# Patient Record
Sex: MALE | Race: WHITE | NOT HISPANIC OR LATINO | URBAN - METROPOLITAN AREA
[De-identification: names, ages, dates, MRNs, and addresses within clinical notes are randomized per-mention and may not be internally consistent; named-entity substitution may affect disease eponyms.]

---

## 2019-11-01 ENCOUNTER — OFFICE VISIT (OUTPATIENT)
Dept: RETAIL CLINIC | Facility: CLINIC | Age: 29
End: 2019-11-01

## 2019-11-01 VITALS
DIASTOLIC BLOOD PRESSURE: 72 MMHG | OXYGEN SATURATION: 98 % | SYSTOLIC BLOOD PRESSURE: 110 MMHG | TEMPERATURE: 97.9 F | HEART RATE: 88 BPM | RESPIRATION RATE: 18 BRPM

## 2019-11-01 DIAGNOSIS — B97.89 VIRAL RESPIRATORY ILLNESS: Primary | ICD-10-CM

## 2019-11-01 DIAGNOSIS — H66.92 LEFT OTITIS MEDIA, UNSPECIFIED OTITIS MEDIA TYPE: ICD-10-CM

## 2019-11-01 DIAGNOSIS — J98.8 VIRAL RESPIRATORY ILLNESS: Primary | ICD-10-CM

## 2019-11-01 PROCEDURE — 99203 OFFICE O/P NEW LOW 30 MIN: CPT | Performed by: NURSE PRACTITIONER

## 2019-11-01 RX ORDER — METHYLPREDNISOLONE 4 MG/1
TABLET ORAL
Qty: 21 TABLET | Refills: 0 | Status: SHIPPED | OUTPATIENT
Start: 2019-11-01

## 2019-11-01 RX ORDER — AZITHROMYCIN 250 MG/1
TABLET, FILM COATED ORAL
Qty: 6 TABLET | Refills: 0 | Status: SHIPPED | OUTPATIENT
Start: 2019-11-01

## 2019-11-01 RX ORDER — ESCITALOPRAM OXALATE 10 MG/1
10 TABLET ORAL DAILY
COMMUNITY

## 2019-11-01 NOTE — PROGRESS NOTES
Subjective   Bassem Hodges Jr. is a 29 y.o. male.     URI    This is a new problem. The current episode started in the past 7 days (3 days). The problem has been gradually worsening. Maximum temperature: unmeaured. Associated symptoms include congestion, coughing, headaches, a plugged ear sensation (left), rhinorrhea, sinus pain, sneezing and a sore throat. Pertinent negatives include no wheezing. Treatments tried: dayquil, mucinex. The treatment provided no relief.        The following portions of the patient's history were reviewed and updated as appropriate: allergies, current medications, past family history, past medical history, past social history, past surgical history and problem list.    Review of Systems   Constitutional: Positive for chills and fatigue. Negative for fever.   HENT: Positive for congestion, postnasal drip, rhinorrhea, sinus pressure, sneezing and sore throat.    Respiratory: Positive for cough. Negative for chest tightness, shortness of breath and wheezing.    Cardiovascular: Negative.    Gastrointestinal: Negative.    Musculoskeletal: Positive for myalgias.       Objective   Physical Exam   Constitutional: He is oriented to person, place, and time. He appears well-developed.   HENT:   Head: Normocephalic.   Right Ear: Ear canal normal. A middle ear effusion is present.   Left Ear: Ear canal normal. Tympanic membrane is erythematous. A middle ear effusion is present.   Nose: Mucosal edema and congestion present. No rhinorrhea. Right sinus exhibits no maxillary sinus tenderness and no frontal sinus tenderness. Left sinus exhibits no maxillary sinus tenderness and no frontal sinus tenderness.   Mouth/Throat: Uvula is midline, oropharynx is clear and moist and mucous membranes are normal. No tonsillar exudate.   Cardiovascular: Normal rate, regular rhythm and normal heart sounds.   Pulmonary/Chest: Effort normal and breath sounds normal.   Lymphadenopathy:     He has no cervical  adenopathy.   Neurological: He is alert and oriented to person, place, and time.   Skin: Skin is warm and dry.   Psychiatric: He has a normal mood and affect.     Vitals:    11/01/19 1520   BP: 110/72   Pulse: 88   Resp: 18   Temp: 97.9 °F (36.6 °C)   TempSrc: Oral   SpO2: 98%         Assessment/Plan   Bassem was seen today for uri.    Diagnoses and all orders for this visit:    Viral respiratory illness  -     methylPREDNISolone (MEDROL, PERFECTO,) 4 MG tablet; Take as directed on package instructions.    Left otitis media, unspecified otitis media type  -     methylPREDNISolone (MEDROL, PERFECTO,) 4 MG tablet; Take as directed on package instructions.  -     azithromycin (ZITHROMAX Z-PERFECTO) 250 MG tablet; Take 2 tablets the first day, then 1 tablet daily for 4 days.    Pt to continue taking OTC Mucinex and Dayquil for symptom management.  Pt to follow up with PCP if symptoms worsen or fail to improve.      Viral Respiratory Infection  A respiratory infection is an illness that affects part of the respiratory system, such as the lungs, nose, or throat. A respiratory infection that is caused by a virus is called a viral respiratory infection.  Common types of viral respiratory infections include:  · A cold.  · The flu (influenza).  · A respiratory syncytial virus (RSV) infection.  What are the causes?  This condition is caused by a virus.  What are the signs or symptoms?  Symptoms of this condition include:  · A stuffy or runny nose.  · Yellow or green nasal discharge.  · A cough.  · Sneezing.  · Fatigue.  · Achy muscles.  · A sore throat.  · Sweating or chills.  · A fever.  · A headache.  How is this diagnosed?  This condition may be diagnosed based on:  · Your symptoms.  · A physical exam.  · Testing of nasal swabs.  How is this treated?  This condition may be treated with medicines, such as:  · Antiviral medicine. This may shorten the length of time a person has symptoms.  · Expectorants. These make it easier to cough up  mucus.  · Decongestant nasal sprays.  · Acetaminophen or NSAIDs to relieve fever and pain.  Antibiotic medicines are not prescribed for viral infections. This is because antibiotics are designed to kill bacteria. They are not effective against viruses.  Follow these instructions at home:    Managing pain and congestion  · Take over-the-counter and prescription medicines only as told by your health care provider.  · If you have a sore throat, gargle with a salt-water mixture 3-4 times a day or as needed. To make a salt-water mixture, completely dissolve ½-1 tsp of salt in 1 cup of warm water.  · Use nose drops made from salt water to ease congestion and soften raw skin around your nose.  · Drink enough fluid to keep your urine pale yellow. This helps prevent dehydration and helps loosen up mucus.  General instructions  · Rest as much as possible.  · Do not drink alcohol.  · Do not use any products that contain nicotine or tobacco, such as cigarettes and e-cigarettes. If you need help quitting, ask your health care provider.  · Keep all follow-up visits as told by your health care provider. This is important.  How is this prevented?    · Get an annual flu shot. You may get the flu shot in late summer, fall, or winter. Ask your health care provider when you should get your flu shot.  · Avoid exposing others to your respiratory infection.  ? Stay home from work or school as told by your health care provider.  ? Wash your hands with soap and water often, especially after you cough or sneeze. If soap and water are not available, use alcohol-based hand .  · Avoid contact with people who are sick during cold and flu season. This is generally fall and winter.  Contact a health care provider if:  · Your symptoms last for 10 days or longer.  · Your symptoms get worse over time.  · You have a fever.  · You have severe sinus pain in your face or forehead.  · The glands in your jaw or neck become very swollen.  Get help  right away if you:  · Feel pain or pressure in your chest.  · Have shortness of breath.  · Faint or feel like you will faint.  · Have severe and persistent vomiting.  · Feel confused or disoriented.  Summary  · A respiratory infection is an illness that affects part of the respiratory system, such as the lungs, nose, or throat. A respiratory infection that is caused by a virus is called a viral respiratory infection.  · Common types of viral respiratory infections are a cold, influenza, and respiratory syncytial virus (RSV) infection.  · Symptoms of this condition include a stuffy or runny nose, cough, sneezing, fatigue, achy muscles, sore throat, and fevers or chills.  · Antibiotic medicines are not prescribed for viral infections. This is because antibiotics are designed to kill bacteria. They are not effective against viruses.  This information is not intended to replace advice given to you by your health care provider. Make sure you discuss any questions you have with your health care provider.  Document Released: 09/27/2006 Document Revised: 01/28/2019 Document Reviewed: 01/28/2019  [employer] Interactive Patient Education © 2019 [employer]  Otitis Media With Effusion, Pediatric    Otitis media with effusion (OME) occurs when there is inflammation of the middle ear and fluid in the middle ear space. There are no signs and symptoms of infection. The middle ear space contains air and the bones for hearing. Air in the middle ear space helps to transmit sound to the brain.  OME is a common condition in children, and it often occurs after an ear infection. This condition may be present for several weeks or longer after an ear infection. Most cases of this condition get better on their own.  What are the causes?  OME is caused by a blockage of the eustachian tube in one or both ears. These tubes drain fluid in the ears to the back of the nose (nasopharynx). If the tissue in the tube swells up (edema), the tube  "closes. This prevents fluid from draining. Blockage can be caused by:  · Ear infections.  · Colds and other upper respiratory infections.  · Allergies.  · Irritants, such as tobacco smoke.  · Enlarged adenoids. The adenoids are areas of soft tissue located high in the back of the throat, behind the nose and the roof of the mouth. They are part of the body’s natural defense (immune) system.  · A mass in the nasopharynx.  · Damage to the ear caused by pressure changes (barotrauma).  What increases the risk?  Your child is more likely to develop this condition if:  · He or she has repeated ear and sinus infections.  · He or she has allergies.  · He or she is exposed to tobacco smoke.  · He or she attends .  · He or she is not .  What are the signs or symptoms?  Symptoms of this condition may not be obvious. Sometimes this condition does not have any symptoms, or symptoms may overlap with those of a cold or upper respiratory tract illness.  Symptoms of this condition include:  · Temporary hearing loss.  · A feeling of fullness in the ear without pain.  · Irritability or agitation.  · Balance (vestibular) problems.  As a result of hearing loss, your child may:  · Listen to the TV at a loud volume.  · Not respond to questions.  · Ask \"What?\" often when spoken to.  · Mistake or confuse one sound or word for another.  · Perform poorly at school.  · Have a poor attention span.  · Become agitated or irritated easily.  How is this diagnosed?  This condition is diagnosed with an ear exam. Your child's health care provider will look inside your child's ear with an instrument (otoscope) to check for redness, swelling, and fluid.  Other tests may be done, including:  · A test to check the movement of the eardrum (pneumatic otoscopy). This is done by squeezing a small amount of air into the ear.  · A test that changes air pressure in the middle ear to check how well the eardrum moves and to see if the eustachian " tube is working (tympanogram).  · Hearing test (audiogram). This test involves playing tones at different pitches to see if your child can hear each tone.  How is this treated?  Treatment for this condition depends on the cause. In many cases, the fluid goes away on its own.  In some cases, your child may need a procedure to create a hole in the eardrum to allow fluid to drain (myringotomy) and to insert small drainage tubes (tympanostomy tubes) into the eardrums. These tubes help to drain fluid and prevent infection. This procedure may be recommended if:  · OME does not get better over several months.  · Your child has many ear infections within several months.  · Your child has noticeable hearing loss.  · Your child has problems with speech and language development.  Surgery may also be done to remove the adenoids (adenoidectomy).  Follow these instructions at home:  · Give over-the-counter and prescription medicines only as told by your child's health care provider.  · Keep children away from any tobacco smoke.  · Keep all follow-up visits as told by your child's health care provider. This is important.  How is this prevented?  · Keep your child's vaccinations up to date. Make sure your child gets all recommended vaccinations, including a pneumonia and flu vaccine.  · Encourage hand washing. Your child should wash his or her hands often with soap and water. If there is no soap and water, he or she should use hand .  · Avoid exposing your child to tobacco smoke.  · Breastfeed your baby, if possible. Babies who are  as long as possible are less likely to develop this condition.  Contact a health care provider if:  · Your child's hearing does not get better after 3 months.  · Your child's hearing is worse.  · Your child has ear pain.  · Your child has a fever.  · Your child has drainage from the ear.  · Your child is dizzy.  · Your child has a lump on his or her neck.  Get help right away  if:  · Your child has bleeding from the nose.  · Your child cannot move part of her or his face.  · Your child has trouble breathing.  · Your child cannot smell.  · Your child develops severe congestion.  · Your child develops weakness.  · Your child who is younger than 3 months has a temperature of 100°F (38°C) or higher.  Summary  · Otitis media with effusion (OME) occurs when there is inflammation of the middle ear and fluid in the middle ear space.  · This condition is caused by blockage of one or both eustachian tubes, which drain fluid in the ears to the back of the nose.  · Symptoms of this condition can include temporary hearing loss, a feeling of fullness in the ear, irritability or agitation, and balance (vestibular) problems. Sometimes, there are no symptoms.  · This condition is diagnosed with an ear exam and tests, such as pneumatic otoscopy, tympanogram, and audiogram.  · Treatment for this condition depends on the cause. In many cases, the fluid goes away on its own.  This information is not intended to replace advice given to you by your health care provider. Make sure you discuss any questions you have with your health care provider.  Document Released: 03/09/2005 Document Revised: 11/09/2017 Document Reviewed: 11/09/2017  [employer] Interactive Patient Education © 2019 [employer]

## 2019-11-01 NOTE — PATIENT INSTRUCTIONS
Viral Respiratory Infection  A respiratory infection is an illness that affects part of the respiratory system, such as the lungs, nose, or throat. A respiratory infection that is caused by a virus is called a viral respiratory infection.  Common types of viral respiratory infections include:  · A cold.  · The flu (influenza).  · A respiratory syncytial virus (RSV) infection.  What are the causes?  This condition is caused by a virus.  What are the signs or symptoms?  Symptoms of this condition include:  · A stuffy or runny nose.  · Yellow or green nasal discharge.  · A cough.  · Sneezing.  · Fatigue.  · Achy muscles.  · A sore throat.  · Sweating or chills.  · A fever.  · A headache.  How is this diagnosed?  This condition may be diagnosed based on:  · Your symptoms.  · A physical exam.  · Testing of nasal swabs.  How is this treated?  This condition may be treated with medicines, such as:  · Antiviral medicine. This may shorten the length of time a person has symptoms.  · Expectorants. These make it easier to cough up mucus.  · Decongestant nasal sprays.  · Acetaminophen or NSAIDs to relieve fever and pain.  Antibiotic medicines are not prescribed for viral infections. This is because antibiotics are designed to kill bacteria. They are not effective against viruses.  Follow these instructions at home:    Managing pain and congestion  · Take over-the-counter and prescription medicines only as told by your health care provider.  · If you have a sore throat, gargle with a salt-water mixture 3-4 times a day or as needed. To make a salt-water mixture, completely dissolve ½-1 tsp of salt in 1 cup of warm water.  · Use nose drops made from salt water to ease congestion and soften raw skin around your nose.  · Drink enough fluid to keep your urine pale yellow. This helps prevent dehydration and helps loosen up mucus.  General instructions  · Rest as much as possible.  · Do not drink alcohol.  · Do not use any products  that contain nicotine or tobacco, such as cigarettes and e-cigarettes. If you need help quitting, ask your health care provider.  · Keep all follow-up visits as told by your health care provider. This is important.  How is this prevented?    · Get an annual flu shot. You may get the flu shot in late summer, fall, or winter. Ask your health care provider when you should get your flu shot.  · Avoid exposing others to your respiratory infection.  ? Stay home from work or school as told by your health care provider.  ? Wash your hands with soap and water often, especially after you cough or sneeze. If soap and water are not available, use alcohol-based hand .  · Avoid contact with people who are sick during cold and flu season. This is generally fall and winter.  Contact a health care provider if:  · Your symptoms last for 10 days or longer.  · Your symptoms get worse over time.  · You have a fever.  · You have severe sinus pain in your face or forehead.  · The glands in your jaw or neck become very swollen.  Get help right away if you:  · Feel pain or pressure in your chest.  · Have shortness of breath.  · Faint or feel like you will faint.  · Have severe and persistent vomiting.  · Feel confused or disoriented.  Summary  · A respiratory infection is an illness that affects part of the respiratory system, such as the lungs, nose, or throat. A respiratory infection that is caused by a virus is called a viral respiratory infection.  · Common types of viral respiratory infections are a cold, influenza, and respiratory syncytial virus (RSV) infection.  · Symptoms of this condition include a stuffy or runny nose, cough, sneezing, fatigue, achy muscles, sore throat, and fevers or chills.  · Antibiotic medicines are not prescribed for viral infections. This is because antibiotics are designed to kill bacteria. They are not effective against viruses.  This information is not intended to replace advice given to you by  your health care provider. Make sure you discuss any questions you have with your health care provider.  Document Released: 09/27/2006 Document Revised: 01/28/2019 Document Reviewed: 01/28/2019  [employer] Interactive Patient Education © 2019 [employer]  Otitis Media With Effusion, Pediatric    Otitis media with effusion (OME) occurs when there is inflammation of the middle ear and fluid in the middle ear space. There are no signs and symptoms of infection. The middle ear space contains air and the bones for hearing. Air in the middle ear space helps to transmit sound to the brain.  OME is a common condition in children, and it often occurs after an ear infection. This condition may be present for several weeks or longer after an ear infection. Most cases of this condition get better on their own.  What are the causes?  OME is caused by a blockage of the eustachian tube in one or both ears. These tubes drain fluid in the ears to the back of the nose (nasopharynx). If the tissue in the tube swells up (edema), the tube closes. This prevents fluid from draining. Blockage can be caused by:  · Ear infections.  · Colds and other upper respiratory infections.  · Allergies.  · Irritants, such as tobacco smoke.  · Enlarged adenoids. The adenoids are areas of soft tissue located high in the back of the throat, behind the nose and the roof of the mouth. They are part of the body’s natural defense (immune) system.  · A mass in the nasopharynx.  · Damage to the ear caused by pressure changes (barotrauma).  What increases the risk?  Your child is more likely to develop this condition if:  · He or she has repeated ear and sinus infections.  · He or she has allergies.  · He or she is exposed to tobacco smoke.  · He or she attends .  · He or she is not .  What are the signs or symptoms?  Symptoms of this condition may not be obvious. Sometimes this condition does not have any symptoms, or symptoms may overlap with  "those of a cold or upper respiratory tract illness.  Symptoms of this condition include:  · Temporary hearing loss.  · A feeling of fullness in the ear without pain.  · Irritability or agitation.  · Balance (vestibular) problems.  As a result of hearing loss, your child may:  · Listen to the TV at a loud volume.  · Not respond to questions.  · Ask \"What?\" often when spoken to.  · Mistake or confuse one sound or word for another.  · Perform poorly at school.  · Have a poor attention span.  · Become agitated or irritated easily.  How is this diagnosed?  This condition is diagnosed with an ear exam. Your child's health care provider will look inside your child's ear with an instrument (otoscope) to check for redness, swelling, and fluid.  Other tests may be done, including:  · A test to check the movement of the eardrum (pneumatic otoscopy). This is done by squeezing a small amount of air into the ear.  · A test that changes air pressure in the middle ear to check how well the eardrum moves and to see if the eustachian tube is working (tympanogram).  · Hearing test (audiogram). This test involves playing tones at different pitches to see if your child can hear each tone.  How is this treated?  Treatment for this condition depends on the cause. In many cases, the fluid goes away on its own.  In some cases, your child may need a procedure to create a hole in the eardrum to allow fluid to drain (myringotomy) and to insert small drainage tubes (tympanostomy tubes) into the eardrums. These tubes help to drain fluid and prevent infection. This procedure may be recommended if:  · OME does not get better over several months.  · Your child has many ear infections within several months.  · Your child has noticeable hearing loss.  · Your child has problems with speech and language development.  Surgery may also be done to remove the adenoids (adenoidectomy).  Follow these instructions at home:  · Give over-the-counter and " prescription medicines only as told by your child's health care provider.  · Keep children away from any tobacco smoke.  · Keep all follow-up visits as told by your child's health care provider. This is important.  How is this prevented?  · Keep your child's vaccinations up to date. Make sure your child gets all recommended vaccinations, including a pneumonia and flu vaccine.  · Encourage hand washing. Your child should wash his or her hands often with soap and water. If there is no soap and water, he or she should use hand .  · Avoid exposing your child to tobacco smoke.  · Breastfeed your baby, if possible. Babies who are  as long as possible are less likely to develop this condition.  Contact a health care provider if:  · Your child's hearing does not get better after 3 months.  · Your child's hearing is worse.  · Your child has ear pain.  · Your child has a fever.  · Your child has drainage from the ear.  · Your child is dizzy.  · Your child has a lump on his or her neck.  Get help right away if:  · Your child has bleeding from the nose.  · Your child cannot move part of her or his face.  · Your child has trouble breathing.  · Your child cannot smell.  · Your child develops severe congestion.  · Your child develops weakness.  · Your child who is younger than 3 months has a temperature of 100°F (38°C) or higher.  Summary  · Otitis media with effusion (OME) occurs when there is inflammation of the middle ear and fluid in the middle ear space.  · This condition is caused by blockage of one or both eustachian tubes, which drain fluid in the ears to the back of the nose.  · Symptoms of this condition can include temporary hearing loss, a feeling of fullness in the ear, irritability or agitation, and balance (vestibular) problems. Sometimes, there are no symptoms.  · This condition is diagnosed with an ear exam and tests, such as pneumatic otoscopy, tympanogram, and audiogram.  · Treatment for this  condition depends on the cause. In many cases, the fluid goes away on its own.  This information is not intended to replace advice given to you by your health care provider. Make sure you discuss any questions you have with your health care provider.  Document Released: 03/09/2005 Document Revised: 11/09/2017 Document Reviewed: 11/09/2017  [employer] Interactive Patient Education © 2019 [employer]

## 2024-04-21 ENCOUNTER — TELEMEDICINE (OUTPATIENT)
Dept: FAMILY MEDICINE CLINIC | Facility: TELEHEALTH | Age: 34
End: 2024-04-21
Payer: COMMERCIAL

## 2024-04-21 DIAGNOSIS — J30.2 SEASONAL ALLERGIC RHINITIS, UNSPECIFIED TRIGGER: Primary | ICD-10-CM

## 2024-04-21 DIAGNOSIS — R09.81 NASAL SINUS CONGESTION: ICD-10-CM

## 2024-04-21 RX ORDER — FLUTICASONE PROPIONATE 50 MCG
2 SPRAY, SUSPENSION (ML) NASAL DAILY
Qty: 16 G | Refills: 0 | Status: SHIPPED | OUTPATIENT
Start: 2024-04-21

## 2024-04-21 RX ORDER — PSEUDOEPHEDRINE HCL 120 MG/1
120 TABLET, FILM COATED, EXTENDED RELEASE ORAL EVERY 12 HOURS
Qty: 20 TABLET | Refills: 0 | Status: SHIPPED | OUTPATIENT
Start: 2024-04-21

## 2024-04-21 RX ORDER — METHYLPREDNISOLONE 4 MG/1
TABLET ORAL
Qty: 21 TABLET | Refills: 0 | Status: SHIPPED | OUTPATIENT
Start: 2024-04-21

## 2024-04-21 NOTE — PROGRESS NOTES
Recommend OBSERVATION and continued MONITORING for progression to exudative AMD. You have chosen to receive care through a telehealth visit.  Do you consent to use a video/audio connection for your medical care today? Yes     HPI  Bassem Hodges Jr. is a 33 y.o. male  presents with complaint of seasonal allergies that is not being relieved with Allegra and OTC medications. Nasal congestion, headache and sinus pressure. He reports the allergy issue has been present for over a week. He has no nasal discharge but a lot of sinus pressure around his eyes.     Review of Systems   Constitutional: Negative.    HENT:  Positive for congestion and sinus pressure. Negative for sinus pain.    Respiratory: Negative.     Cardiovascular: Negative.    Gastrointestinal: Negative.    Allergic/Immunologic: Positive for environmental allergies.   Neurological:  Positive for headaches.   Psychiatric/Behavioral: Negative.         Past Medical History:   Diagnosis Date    Anxiety        History reviewed. No pertinent family history.    Social History     Socioeconomic History    Marital status:    Tobacco Use    Smoking status: Never   Substance and Sexual Activity    Alcohol use: No    Drug use: No    Sexual activity: Defer         There were no vitals taken for this visit.    PHYSICAL EXAM  Physical Exam   Constitutional: He is oriented to person, place, and time. He appears well-developed and well-nourished. He does not have a sickly appearance. He does not appear ill. No distress.   HENT:   Head: Normocephalic and atraumatic.   Nose: Mucosal edema, rhinorrhea and congestion present. Right sinus exhibits no maxillary sinus tenderness and no frontal sinus tenderness. Left sinus exhibits no maxillary sinus tenderness and no frontal sinus tenderness.   Pulmonary/Chest: Effort normal.  No respiratory distress. He no audible wheeze...  Neurological: He is alert and oriented to person, place, and time.   Psychiatric: He has a normal mood and affect.   Vitals reviewed.      Diagnoses and all orders for this  visit:    1. Seasonal allergic rhinitis, unspecified trigger (Primary)  -     fluticasone (FLONASE) 50 MCG/ACT nasal spray; 2 sprays into the nostril(s) as directed by provider Daily.  Dispense: 16 g; Refill: 0    2. Nasal sinus congestion  -     methylPREDNISolone (MEDROL) 4 MG dose pack; Take as directed on package instructions.  Dispense: 21 tablet; Refill: 0  -     pseudoephedrine (SUDAFED) 120 MG 12 hr tablet; Take 1 tablet by mouth Every 12 (Twelve) Hours.  Dispense: 20 tablet; Refill: 0    Increase fluids and rest  Take steroids with food.   Nasal saline prn nasal dryness.      FOLLOW-UP  As discussed during visit with [facility], if symptoms worsen or fail to improve, follow-up with PCP/Urgent Care/Emergency Department.    Patient verbalizes understanding of medications, instructions for treatment and follow-up.    Vane Saini, APRN  04/21/2024  15:27 EDT    The use of a video visit has been reviewed with the patient and verbal informed consent has been obtained. Myself and Bassem Hodges Jr. participated in this visit. The patient is located in [facility], and I am located in [place], [place]. [employer] and [facility] were utilized.